# Patient Record
Sex: FEMALE | Race: BLACK OR AFRICAN AMERICAN | Employment: FULL TIME | URBAN - METROPOLITAN AREA
[De-identification: names, ages, dates, MRNs, and addresses within clinical notes are randomized per-mention and may not be internally consistent; named-entity substitution may affect disease eponyms.]

---

## 2017-08-22 ENCOUNTER — HOSPITAL ENCOUNTER (OUTPATIENT)
Dept: PHYSICAL THERAPY | Age: 47
Discharge: HOME | End: 2017-08-22
Payer: COMMERCIAL

## 2017-08-22 PROCEDURE — 97110 THERAPEUTIC EXERCISES: CPT

## 2017-08-22 PROCEDURE — 97162 PT EVAL MOD COMPLEX 30 MIN: CPT

## 2017-08-22 NOTE — PROGRESS NOTES
PHYSICAL THERAPY - DAILY TREATMENT NOTE    Patient Name: Simone Lainez        Date: 2017  : 1970   YES Patient  Verified  Visit #:     Insurance: Payor: BLUE CROSS / Plan: [address] [place] [address] / Product Type: PPO /      In time: 330 Out time: 400   Total Treatment Time: 30     TREATMENT AREA = Lx spine    SUBJECTIVE  Pain Level (on 0 to 10 scale):  ie  / 10   Medication Changes/New allergies or changes in medical history, any new surgeries or procedures?     NO    If yes, update Summary List   Subjective Functional Status/Changes:  []  No changes reported     See POC          OBJECTIVE  Modality rationale:      min [] Estim, type:                                          []  att     []  unatt     []  w/US     []  w/ice    []  w/heat    min []  Mechanical Traction: type/lbs                                               []  pro   []  sup   []  int   []  cont    min []  Ultrasound, settings/location:      min []  Iontophoresis:  []  take home patch w/ dexamethazone    min                                []  in clinic w/ dexamethazone    min []  Ice     []  Heat     position:     min []  Other:       min Manual Therapy:       min Therapeutic Exercise:  [x]  See flow sheet   []  Other:      []  Added:     to improve (function):    []  Changed:     to improve (function):      8 min Patient Education:  YES  Reviewed HEP   []  Progressed/Changed HEP based on:   Reviewed therx per flow sheet, use of lumbar roll during sitting and sleeping postures      Other Objective/Functional Measures:    See POC     Post Treatment Pain Level (on 0 to 10) scale:   ie  / 10     ASSESSMENT  []  See Progress Note/Recertification   Patient will continue to benefit from skilled therapy to address remaining functional deficits: Decreased positional tolerance, ADL tolerance, leisure activities    Progress toward goals / Updated goals:    See POC     PLAN  []  Upgrade activities as tolerated YES Continue plan of care   []  Discharge due to :    []  Other:      Therapist: Miguelito Palmer DPT, CIMT    Date: 8/22/2017 Time: 4:12 PM

## 2017-08-22 NOTE — PROGRESS NOTES
[facility] PHYSICAL THERAPY  [address] [place] [address] [address],[place], [address] [facility] - Phone: [phone]  Fax: [phone] / [address] [facility]  Patient Name: Erin Green : 1970   Treatment   Diagnosis: Low back pain Medical   Diagnosis: Low back pain [M54.5]   Onset Date: Chronic      Referral Source: Evens Morrison MD Start of Care [facility]): 2017   Prior Hospitalization: See medical history Provider #: [id number]   Prior Level of Function: Limited with ADLs and positional tolerance   Comorbidities: Asthma, Hx of C section 20 yrs ago, Partial Hysterectomy 10 yrs ago   Medications: Verified on Patient Summary List   The Plan of Care and following information is based on the information from the initial evaluation.   ==================================================================================  Assessment / key information:  Pt is a 56 yo female s/p increase in central low back pain of insidious onset 2 years ago . Previous rx has included the following: meds, prednisone dose pack. She presents with pain ranging from 4-10/10, located lumbar spine. Pain is made worse with activity, prolonged positions, transitional activities, better with rest, meds. Pt has no c/o radicular sxs in the (B) LE.  L/S AROM: flexion 50 deg, extension 0 deg, LSB 10 deg, RSB 10 deg,all motions guarded and present with pain. Palpation reveals TTP increased hypertonicity to (R) QL, (L) glute med, spinous process of L5/s1. Posture Decrease in lumbar lordosis. Sensation is intact to light touch. MMT LEs: hip flexion 4/5, extension 3/5, abduction 3/5, adduction 4/5, knee flexion 4/5, knee extension 4/5, ankle 4/5. Repeated movement testing reveals no directional preference secondary pain, sxs consistent with Lumbagp.  (-) Special tests include: Slump.  (+) Special tests include: (R) SLR, Seated lumbar extension test, Sacral Flexion Test, PA provocation test of L5/S1  Pt will benefit from PT interventions to address the aforementioned deficits and allow pt to return to PLOF. Eval Complexity: History HIGH Complexity :3+ comorbidities / personal factors will impact the outcome/ POC ;  Examination  MEDIUM Complexity : 3 Standardized tests and measures addressing body structure, function, activity limitation and / or participation in recreation ; Presentation MEDIUM Complexity : Evolving with changing characteristics ; Decision Making MEDIUM Complexity : FOTO score of 26-74; Overall Complexity MEDIUM  ==================================================================================  Problem List: pain affecting function, decrease ROM, decrease strength, decrease ADL/ functional abilitiies, decrease activity tolerance, decrease flexibility/ joint mobility and decrease transfer abilities   Treatment Plan may include any combination of the following: Therapeutic exercise, Therapeutic activities, Neuromuscular re-education, Physical agent/modality, Manual therapy and Patient education  Patient / Family readiness to learn indicated by: asking questions, trying to perform skills and interest  Persons(s) to be included in education: patient (P)  Barriers to Learning/Limitations: no  Measures taken:    Patient Goal (s): Decrease pain and increase positional tolerance    Patient self reported health status: good  Rehabilitation Potential: good   Short Term Goals: To be accomplished in  2  weeks:  1. Pt will be independent and compliant with HEP to decrease pain, increase ROM and return pt to PLOF. 2. Pt will note pain less than or equal to 5/10 at worst to allow increase in functional abilities. 3. Pt will demonstrates increase in Lx AROM extension to 10 deg to aid in positional tolerance    Long Term Goals: To be accomplished in  4  weeks:  1. Increase score on FOTO by > or = 17 to demo an increase in functional activity tolerance.    2. Pt will note < or = 2/10 pain with all mobility to improve comfort with ADLs. 3. Pt will demonstrate GROC >/= 4+ to allow increase in functional activity tolerance   Frequency / Duration:   Patient to be seen  2-3  times per week for 4  weeks:  Patient / Caregiver education and instruction: self care, activity modification and exercises  G-Codes (GP): preet  Therapist Signature: Paco KRUEGERT, CIMT Date: 2/08/8608   Certification Period: na Time: 4:04 PM   ===========================================================================================  I certify that the above Physical Therapy Services are being furnished while the patient is under my care. I agree with the treatment plan and certify that this therapy is necessary. Physician Signature:        Date:       Time:     Please sign and return to [facility] or you may fax the signed copy to [phone]. Thank you.

## 2017-08-24 ENCOUNTER — HOSPITAL ENCOUNTER (OUTPATIENT)
Dept: PHYSICAL THERAPY | Age: 47
Discharge: HOME | End: 2017-08-24
Payer: COMMERCIAL

## 2017-08-24 PROCEDURE — 97140 MANUAL THERAPY 1/> REGIONS: CPT

## 2017-08-24 PROCEDURE — 97110 THERAPEUTIC EXERCISES: CPT

## 2017-08-24 PROCEDURE — 97014 ELECTRIC STIMULATION THERAPY: CPT

## 2017-08-24 NOTE — PROGRESS NOTES
PHYSICAL THERAPY - DAILY TREATMENT NOTE    Patient Name: Milton Navarro        Date: 2017  : 1970   YES Patient  Verified  Visit #:   2   of   12  Insurance: Payor: BLUE CROSS / Plan: [address] [place] [address] / Product Type: PPO /      In time: 230 Out time: 335   Total Treatment Time: 65     Medicare Time Tracking (below)   Total Timed Codes (min):  50 1:1 Treatment Time:       TREATMENT AREA =  Low back pain [M54.5]    SUBJECTIVE  Pain Level (on 0 to 10 scale):  5  / 10   Medication Changes/New allergies or changes in medical history, any new surgeries or procedures? NO    If yes, update Summary List   Subjective Functional Status/Changes:  []  No changes reported     Pain reported in the low back. Occasionally down the (R) hip. States this hasn't happened for about a week. OBJECTIVE  Modalities Rationale:     decrease inflammation, decrease pain and increase tissue extensibility to improve patient's ability to perform pain free ADLs. 10 min [x] Estim, type/location: IFC, (B) lumbar paraspinals. (R) side lying. []  att     [x]  unatt     []  w/US     [x]  w/ice    []  w/heat    min []  Mechanical Traction: type/lbs                   []  pro   []  sup   []  int   []  cont    []  before manual    []  after manual    min []  Ultrasound, settings/location:      min []  Iontophoresis w/ dexamethasone, location:                                               []  take home patch       []  in clinic   10 min []  Ice     [x]  Heat    location/position: Semi-reclined, lumbar.      min []  Vasopneumatic Device, press/temp:     min []  Other:    [x] Skin assessment post-treatment (if applicable):    [x]  intact    []  redness- no adverse reaction     []redness  adverse reaction:        35 min Therapeutic Exercise:  [x]  See flow sheet   Rationale:      increase ROM, increase strength and improve coordination to improve the patients ability to perform pain free ADLs. 15 Min Manual Therapy: TPR (R) lumbar paraspinals, QL, glute/piriformis. Rationale:      decrease pain, increase ROM, increase tissue extensibility and decrease trigger points to improve patient's ability to perform pain free ADLs. min Patient Education:  YES  Reviewed HEP   []  Progressed/Changed HEP based on: Other Objective/Functional Measures: Added multiple exercises per flow sheet. Post Treatment Pain Level (on 0 to 10) scale:   3  / 10     ASSESSMENT  Assessment/Changes in Function:     Difficulty with prone lying - could not tolerate w/ less than 2 pillows under hips. Unable to attempt full prone lying or prone prop. Very TTP along (R) QL and lumbar paraspinals. []  See Progress Note/Recertification   Patient will continue to benefit from skilled PT services to modify and progress therapeutic interventions, address functional mobility deficits, address ROM deficits, address strength deficits, analyze and address soft tissue restrictions, analyze and cue movement patterns, analyze and modify body mechanics/ergonomics and assess and modify postural abnormalities to attain remaining goals. Progress toward goals / Updated goals:    Initiated therex.       PLAN  [x]  Upgrade activities as tolerated YES Continue plan of care   []  Discharge due to :    []  Other:      Therapist: Asad Cates PTA    Date: 8/24/2017 Time: 3:46 PM     Future Appointments  Date Time Provider Fabio Kumar   8/28/2017 4:00 PM Malissa Plant [facility]   8/31/2017 12:00 PM Asad Cates PTA [facility]   9/5/2017 4:00  [address] [facility]   9/7/2017 2:00 PM Gris Delatorre PT [facility]   9/12/2017 2:00 PM Gris Delatorre PT [facility]   9/14/2017 2:00 PM Gris Delatorre PT [facility]   9/19/2017 4:00 PM [facility] PT [facility] 3 [facility]   9/21/2017 2:00 PM Gris Delatorre PT [facility]

## 2017-08-28 ENCOUNTER — HOSPITAL ENCOUNTER (OUTPATIENT)
Dept: PHYSICAL THERAPY | Age: 47
Discharge: HOME | End: 2017-08-28
Payer: COMMERCIAL

## 2017-08-28 PROCEDURE — 97110 THERAPEUTIC EXERCISES: CPT

## 2017-08-28 PROCEDURE — 97140 MANUAL THERAPY 1/> REGIONS: CPT

## 2017-08-28 NOTE — PROGRESS NOTES
PHYSICAL THERAPY - DAILY TREATMENT NOTE    Patient Name: Sulma Saldana        Date: 2017  : 1970   YES Patient  Verified  Visit #:   3   of   12  Insurance: Payor: BLUE CROSS / Plan: [address] [place] [address] / Product Type: PPO /      In time: 4 Out time: 455   Total Treatment Time: 55     Medicare Time Tracking (below)   Total Timed Codes (min):  45 1:1 Treatment Time:       TREATMENT AREA =  Low back pain [M54.5]    SUBJECTIVE  Pain Level (on 0 to 10 scale):  6  / 10   Medication Changes/New allergies or changes in medical history, any new surgeries or procedures? NO    If yes, update Summary List   Subjective Functional Status/Changes:  []  No changes reported     \"I've been laying on my stomach a little at home. It's gotten a lot easier. \"        OBJECTIVE  Modalities Rationale:     decrease pain and increase tissue extensibility to improve patient's ability to perform pain free ADLs. min [] Estim, type/location:                                      []  att     []  unatt     []  w/US     []  w/ice    []  w/heat    min []  Mechanical Traction: type/lbs                   []  pro   []  sup   []  int   []  cont    []  before manual    []  after manual    min []  Ultrasound, settings/location:      min []  Iontophoresis w/ dexamethasone, location:                                               []  take home patch       []  in clinic   10 min []  Ice     [x]  Heat    location/position: Prone, lumbar. min []  Vasopneumatic Device, press/temp:     min []  Other:    [x] Skin assessment post-treatment (if applicable):    [x]  intact    []  redness- no adverse reaction     []redness  adverse reaction:        35 min Therapeutic Exercise:  [x]  See flow sheet   Rationale:      increase ROM, increase strength, improve coordination and improve balance to improve the patients ability to perform pain free ADLs. 10 Min Manual Therapy: TPR (R) lumbar paraspinals, QL, glute med, piriformis. Rationale:      decrease pain, increase ROM, increase tissue extensibility and decrease trigger points to improve patient's ability to perform pain free ADLs. min Patient Education:  YES  Reviewed HEP   []  Progressed/Changed HEP based on: Other Objective/Functional Measures: Therex per flow sheet. Initiated prone lying w/ 3 pillows under hips. 2 pillows @ 1', 1 pillow @ 1', prone no pillow @ 1', prone prop @ 1'. Progressed pt to PPU x10. Attempted lock and sag, painful @ end range. Post Treatment Pain Level (on 0 to 10) scale:   4  / 10     ASSESSMENT  Assessment/Changes in Function:     Pt demonstrating significant improvement in tolerance to lumbar extension. []  See Progress Note/Recertification   Patient will continue to benefit from skilled PT services to modify and progress therapeutic interventions, address functional mobility deficits, address ROM deficits, address strength deficits, analyze and address soft tissue restrictions, analyze and cue movement patterns, analyze and modify body mechanics/ergonomics and assess and modify postural abnormalities to attain remaining goals. Progress toward goals / Updated goals:    Progressing toward STG #3.       PLAN  [x]  Upgrade activities as tolerated YES Continue plan of care   []  Discharge due to :    []  Other:      Therapist: Sidra Paul PTA    Date: 8/28/2017 Time: 4:45 PM     Future Appointments  Date Time Provider Fabio Kumar   8/31/2017 12:00 PM Fatimah Maytrena [facility]   9/5/2017 4:00  [address] 3 [facility]   9/7/2017 2:00 PM Lindy Im, PT [facility]   9/12/2017 2:00 PM Lindy Im, PT [facility]   9/14/2017 2:00 PM Lindy Im, PT [facility]   9/19/2017 4:00 PM [facility] PT [facility] 3 [facility]   9/21/2017 2:00 PM Lindy Im, PT [facility]

## 2017-08-31 ENCOUNTER — HOSPITAL ENCOUNTER (OUTPATIENT)
Dept: PHYSICAL THERAPY | Age: 47
Discharge: HOME | End: 2017-08-31
Payer: COMMERCIAL

## 2017-08-31 PROCEDURE — 97014 ELECTRIC STIMULATION THERAPY: CPT

## 2017-08-31 PROCEDURE — 97110 THERAPEUTIC EXERCISES: CPT

## 2017-08-31 PROCEDURE — 97140 MANUAL THERAPY 1/> REGIONS: CPT

## 2017-08-31 NOTE — PROGRESS NOTES
PHYSICAL THERAPY - DAILY TREATMENT NOTE    Patient Name: Sulma Saldana        Date: 2017  : 1970   YES Patient  Verified  Visit #:   4   of   12  Insurance: Payor: BLUE CROSS / Plan: [address] [place] [address] / Product Type: PPO /      In time: 12 Out time: 1255   Total Treatment Time: 55     Medicare Time Tracking (below)   Total Timed Codes (min):  45 1:1 Treatment Time:       TREATMENT AREA =  Low back pain [M54.5]    SUBJECTIVE  Pain Level (on 0 to 10 scale):  4  / 10   Medication Changes/New allergies or changes in medical history, any new surgeries or procedures? NO    If yes, update Summary List   Subjective Functional Status/Changes:  []  No changes reported     Pain reported across low back and into the (R) glute. OBJECTIVE  Modalities Rationale:     decrease inflammation and decrease pain  to improve patient's ability to perform pain free ADLs. 10 min [x] Estim, type/location: Lumbar IFC in prone, w/ ice                                     []  att     [x]  unatt     []  w/US     [x]  w/ice    []  w/heat    min []  Mechanical Traction: type/lbs                   []  pro   []  sup   []  int   []  cont    []  before manual    []  after manual    min []  Ultrasound, settings/location:      min []  Iontophoresis w/ dexamethasone, location:                                               []  take home patch       []  in clinic   10 min []  Ice     [x]  Heat    location/position: Prone, lumbar. min []  Vasopneumatic Device, press/temp:     min []  Other:    [x] Skin assessment post-treatment (if applicable):    [x]  intact    []  redness- no adverse reaction     []redness  adverse reaction:        25 min Therapeutic Exercise:  [x]  See flow sheet   Rationale:      increase ROM, increase strength and improve coordination to improve the patients ability to perform pain free ADLs. 10 Min Manual Therapy: STM/DTM (B) lumbar paraspinals, (R) glute/piriformis.     Rationale: decrease pain, increase ROM, increase tissue extensibility and decrease trigger points to improve patient's ability to perform pain free ADLs. min Patient Education:  YES  Reviewed HEP   []  Progressed/Changed HEP based on: Other Objective/Functional Measures:    Bassem progression:   Prone lying  Prone prop  PPU    Slow progression to PPU due to pt c/o pain. Gradually relieved with extension activities. Once on PPU, pt reporting relief from symptoms when rising, increase in pain as she returns to lying. Post Treatment Pain Level (on 0 to 10) scale:   3  / 10     ASSESSMENT  Assessment/Changes in Function:     Treatment concluded with pt reporting pain across low back and into posterior (R) upper hip. []  See Progress Note/Recertification   Patient will continue to benefit from skilled PT services to modify and progress therapeutic interventions, address functional mobility deficits, address ROM deficits, address strength deficits, analyze and address soft tissue restrictions, analyze and cue movement patterns, analyze and modify body mechanics/ergonomics and assess and modify postural abnormalities to attain remaining goals. Progress toward goals / Updated goals:    No change in progress toward LTG's with today's session.       PLAN  [x]  Upgrade activities as tolerated YES Continue plan of care   []  Discharge due to :    []  Other:      Therapist: Ansley Sánchez PTA    Date: 8/31/2017 Time: 12:13 PM     Future Appointments  Date Time Provider Fabio Kumar   9/5/2017 4:00 PM [address] [place] [address] 3 [facility]   9/7/2017 2:00 PM Citlaly Medina PT [facility]   9/12/2017 2:00 PM Citlaly Medina PT [facility]   9/14/2017 2:00 PM Citlaly Medina PT [facility]   9/19/2017 4:00 PM [facility] PT [facility] 3 [facility]   9/21/2017 2:00 PM Citlaly Medina PT [facility]

## 2017-09-05 ENCOUNTER — HOSPITAL ENCOUNTER (OUTPATIENT)
Dept: PHYSICAL THERAPY | Age: 47
Discharge: HOME | End: 2017-09-05
Payer: COMMERCIAL

## 2017-09-05 PROCEDURE — 97014 ELECTRIC STIMULATION THERAPY: CPT

## 2017-09-05 PROCEDURE — 97110 THERAPEUTIC EXERCISES: CPT

## 2017-09-05 PROCEDURE — 97140 MANUAL THERAPY 1/> REGIONS: CPT

## 2017-09-05 NOTE — PROGRESS NOTES
PHYSICAL THERAPY - DAILY TREATMENT NOTE    Patient Name: Simone Lainez        Date: 2017  : 1970   YES Patient  Verified  Visit #:     of   12  Insurance: Payor: Hadley Epley / Plan: [address] [place] [address] / Product Type: PPO /      In time: 4:05 Out time: 5:00   Total Treatment Time: 55     Medicare Time Tracking (below)   Total Timed Codes (min):   1:1 Treatment Time:       TREATMENT AREA =  Low back pain [M54.5]    SUBJECTIVE  Pain Level (on 0 to 10 scale):  2  / 10   Medication Changes/New allergies or changes in medical history, any new surgeries or procedures? NO    If yes, update Summary List   Subjective Functional Status/Changes:  []  No changes reported   \"Today is a good today. Being in any one position too long makes my back hurt. I feel like I can kneel longer since starting PT. \"       OBJECTIVE  Modalities Rationale:     decrease inflammation and decrease pain  to improve patient's ability to perform pain free ADLs. 10 min [x] Estim, type/location: Lumbar IFC in prone, w/ ice Prior to TE                                    []  att     [x]  unatt     []  w/US     [x]  w/ice    []  w/heat    min []  Mechanical Traction: type/lbs                   []  pro   []  sup   []  int   []  cont    []  before manual    []  after manual    min []  Ultrasound, settings/location:      min []  Iontophoresis w/ dexamethasone, location:                                               []  take home patch       []  in clinic   10 min []  Ice     [x]  Heat    location/position: Prone, lumbar. min []  Vasopneumatic Device, press/temp:     min []  Other:    [x] Skin assessment post-treatment (if applicable):    [x]  intact    []  redness- no adverse reaction     []redness  adverse reaction:        25 min Therapeutic Exercise:  [x]  See flow sheet   Rationale:      increase ROM, increase strength and improve coordination to improve the patients ability to perform pain free ADLs.       [address] Therapy: STM (B) lumbar paraspinals, (R) glute/piriformis. Rationale:      decrease pain, increase ROM, increase tissue extensibility and decrease trigger points to improve patient's ability to perform pain free ADLs. X min Patient Education:  YES  Reviewed HEP   []  Progressed/Changed HEP based on: Other Objective/Functional Measures:    Bassem progression:   Prone lying x1'  Prone prop x1'  PPU x 10     Post Treatment Pain Level (on 0 to 10) scale:   2 / 10     ASSESSMENT  Assessment/Changes in Function:     Due to sedentary job at [employer]; advised pt to change position every 45' and instructed pt in ergonomics for computer workstation to optimize upright posture and use of lumbar roll due to pt sitting with decreased lordosis and FH/RS. Poor tolerance to STM with hypersensitivity @ L5/S1 junction and R prox glut. []  See Progress Note/Recertification   Patient will continue to benefit from skilled PT services to modify and progress therapeutic interventions, address functional mobility deficits, address ROM deficits, address strength deficits, analyze and address soft tissue restrictions, analyze and cue movement patterns, analyze and modify body mechanics/ergonomics and assess and modify postural abnormalities to attain remaining goals. Progress toward goals / Updated goals:    Slow progress towards STG#3.       PLAN  [x]  Upgrade activities as tolerated YES Continue plan of care   []  Discharge due to :    []  Other:      Therapist: Faith Morton PTA    Date: 9/5/2017 Time: 12:13 PM     Future Appointments  Date Time Provider Fabio Kumar   9/5/2017 4:00  [address] 3 [facility]   9/7/2017 2:00 PM Kash Poplin, PT [facility]   9/12/2017 2:00 PM [place] Poplin, PT [facility]   9/14/2017 2:00 PM [place] Pop[place], PT [facility]   9/19/2017 4:00 PM [facility] PT [facility] 3 [facility]   9/21/2017 2:00 PM [place], PT [facility]

## 2017-09-07 ENCOUNTER — HOSPITAL ENCOUNTER (OUTPATIENT)
Dept: PHYSICAL THERAPY | Age: 47
Discharge: HOME | End: 2017-09-07
Payer: COMMERCIAL

## 2017-09-07 PROCEDURE — 97110 THERAPEUTIC EXERCISES: CPT

## 2017-09-07 PROCEDURE — 97014 ELECTRIC STIMULATION THERAPY: CPT

## 2017-09-07 PROCEDURE — 97140 MANUAL THERAPY 1/> REGIONS: CPT

## 2017-09-07 NOTE — PROGRESS NOTES
PHYSICAL THERAPY - DAILY TREATMENT NOTE    Patient Name: Devonte Starks        Date: 2017  : 1970   YES Patient  Verified  Visit #:   6   of   12  Insurance: Payor: BLUE CROSS / Plan: [address] [place] [address] / Product Type: PPO /      In time: 200 Out time: 300   Total Treatment Time: 60     Medicare Time Tracking (below)   Total Timed Codes (min):   1:1 Treatment Time:       TREATMENT AREA =  Low back pain [M54.5]    SUBJECTIVE  Pain Level (on 0 to 10 scale):  3  / 10   Medication Changes/New allergies or changes in medical history, any new surgeries or procedures? NO    If yes, update Summary List   Subjective Functional Status/Changes:  []  No changes reported     Those exercise do help with some of the pain. And i've been using support in my back when i'm sitting. OBJECTIVE  Modalities Rationale:     decrease inflammation and decrease pain to improve patient's ability to perform pain free ADLs. 10 min [x] Estim, type/location: Lumbar IFC in prone, w/ ice                                 Prior to TE                                     []  att     [x]  unatt     []  w/US     [x]  w/ice    []  w/heat    min []  Mechanical Traction: type/lbs                   []  pro   []  sup   []  int   []  cont    []  before manual    []  after manual    min []  Ultrasound, settings/location:      min []  Iontophoresis w/ dexamethasone, location:                                               []  take home patch       []  in clinic   10 min []  Ice     [x]  Heat    location/position: Post L/s    min []  Vasopneumatic Device, press/temp:     min []  Other:    [x] Skin assessment post-treatment (if applicable):    [x]  intact    [x]  redness- no adverse reaction     []redness  adverse reaction:        30 min Therapeutic Exercise:  [x]  See flow sheet   Rationale:      increase ROM and increase strength to improve the patients ability to  perform pain free ADLs.        10 min Manual Therapy: STM (B) lumbar paraspinals, (R) glute/piriformis. Rationale:      decrease pain, increase ROM and increase tissue extensibility to improve patient's ability to  perform pain free ADLs.       min Patient Education:  YES  Reviewed HEP   []  Progressed/Changed HEP based on: Other Objective/Functional Measures:    GROC: +2 a little better  FOTO: 59 vs 46 on IE. Performed hip extension with 1 pillow to decrease low back pain with exercise. Add YTB to clams and bridges. Post Treatment Pain Level (on 0 to 10) scale:   5  / 10     ASSESSMENT  Assessment/Changes in Function:     Reports HEP becoming slightly easier to perform and slightly increased back motion. Reports using a roll at work for back support which helps with her sitting posture. []  See Progress Note/Recertification   Patient will continue to benefit from skilled PT services to modify and progress therapeutic interventions, address functional mobility deficits, address ROM deficits, address strength deficits, analyze and address soft tissue restrictions and analyze and cue movement patterns to attain remaining goals. Progress toward goals / Updated goals:    · Long Term Goals: To be accomplished in  4  weeks:  1. Increase score on FOTO by > or = 17 to demo an increase in functional activity tolerance: progressing well: 13 point improvement    2. Pt will note < or = 2/10 pain with all mobility to improve comfort with ADLs: slowly progressing    3.  Pt will demonstrate GROC >/= 4+ to allow increase in functional activity tolerance: progressing well     PLAN  []  Upgrade activities as tolerated YES Continue plan of care   []  Discharge due to :    []  Other:      Therapist: MARCY Brennan    Date: 9/7/2017 Time: 2:05 PM       Future Appointments  Date Time Provider Fabio Kumar   9/12/2017 2:00 PM Katherine Lagunas, PT [facility]   9/14/2017 2:00 PM Katherine Lagunas PT [facility]   9/19/2017 4:00 PM [facility]  [address] [facility] 9/21/2017 2:00 PM VALENTINA Peña [facility]

## 2017-09-12 ENCOUNTER — HOSPITAL ENCOUNTER (OUTPATIENT)
Dept: PHYSICAL THERAPY | Age: 47
Discharge: HOME | End: 2017-09-12
Payer: COMMERCIAL

## 2017-09-12 PROCEDURE — 97110 THERAPEUTIC EXERCISES: CPT

## 2017-09-12 PROCEDURE — 97140 MANUAL THERAPY 1/> REGIONS: CPT

## 2017-09-12 PROCEDURE — 97014 ELECTRIC STIMULATION THERAPY: CPT

## 2017-09-12 NOTE — PROGRESS NOTES
PHYSICAL THERAPY - DAILY TREATMENT NOTE    Patient Name: Cristal Multani        Date: 2017  : 1970   YES Patient  Verified  Visit #:     Insurance: Payor: BLUE CROSS / Plan: [address] [place] [address] / Product Type: PPO /      In time: 200 Out time: 250   Total Treatment Time: 50       TREATMENT AREA = Low back pain [M54.5]    SUBJECTIVE  Pain Level (on 0 to 10 scale):  3  / 10   Medication Changes/New allergies or changes in medical history, any new surgeries or procedures?     NO    If yes, update Summary List   Subjective Functional Status/Changes:  []  No changes reported     Reports decreasing pain levels and decrease in overall lumbar s/s since last visit    \"Its getting better, I have less pain\"          OBJECTIVE  Modalities Rationale:     decrease pain and increase tissue extensibility to improve patient's ability to perform ADLS  10 min [x] Estim, type/location:  IFC to lumbar paraspinals                                     []  att     [x]  unatt     []  w/US     []  w/ice    [x]  w/heat    min []  Mechanical Traction: type/lbs                   []  pro   []  sup   []  int   []  cont    []  before manual    []  after manual    min []  Ultrasound, settings/location:      min []  Iontophoresis w/ dexamethasone, location:                                               []  take home patch       []  in clinic   10 min []  Ice     [x]  Heat    location/position: Lx spine    min []  Vasopneumatic Device, press/temp:     min []  Other:    [x] Skin assessment post-treatment (if applicable):    [x]  intact    [x]  Redness    []redness  adverse reaction:        20 min Therapeutic Exercise:  [x]  See flow sheet   Rationale:      increase ROM and increase strength to improve the patients ability to perform ADLs      10 Min Manual Therapy: DTM to (B) lumbar paraspinals, QL, glute med, PA mobs to Lumbar L4-L5   Rationale:      decrease pain, increase ROM and increase tissue extensibility to improve patient's ability to perform ADLs     min Patient Education:  YES  Reviewed HEP   []  Progressed/Changed HEP based on: Other Objective/Functional Measures:    Con't with significant TTP to (R) glute med, (R) piriformis, mild TTP to (B) lumbar paraspinals,s      Post Treatment Pain Level (on 0 to 10) scale:   0  / 10     ASSESSMENT  Assessment/Changes in Function:     Demonstrates decrease in pain levels after treatment session, progressing with overall function and positional tolerance      []  See Progress Note/Recertification   Patient will continue to benefit from skilled PT services to modify and progress therapeutic interventions, address functional mobility deficits, address ROM deficits, address strength deficits and analyze and address soft tissue restrictions to attain remaining goals.    Progress toward goals / Updated goals:    Progressing with STGs 2-3     PLAN  []  Upgrade activities as tolerated YES Continue plan of care   []  Discharge due to :    []  Other:      Therapist: Vicenta KRUEGERT, CIMT    Date: 9/12/2017 Time: 2:05 PM       Future Appointments  Date Time Provider Fabio Kumar   [facility]2017 [facility]   9/19/2017 4:00 PM [address] [place] [address]   9/21/2017 2:00 PM Aleksey Marques PT [facility]

## 2017-09-14 ENCOUNTER — HOSPITAL ENCOUNTER (OUTPATIENT)
Dept: PHYSICAL THERAPY | Age: 47
Discharge: HOME | End: 2017-09-14
Payer: COMMERCIAL

## 2017-09-14 PROCEDURE — 97140 MANUAL THERAPY 1/> REGIONS: CPT

## 2017-09-14 PROCEDURE — 97014 ELECTRIC STIMULATION THERAPY: CPT

## 2017-09-14 PROCEDURE — 97110 THERAPEUTIC EXERCISES: CPT

## 2017-09-14 NOTE — PROGRESS NOTES
PHYSICAL THERAPY - DAILY TREATMENT NOTE    Patient Name: [facility]        Date: 2017  : 1970   YES Patient  Verified  Visit #:   8     Insurance: Payor: BLUE CROSS / Plan: [address] [place] [address] / Product Type: PPO /      In time: 200 Out time: 250   Total Treatment Time: 50       TREATMENT AREA = Low back pain [M54.5]    SUBJECTIVE  Pain Level (on 0 to 10 scale):  2  / 10   Medication Changes/New allergies or changes in medical history, any new surgeries or procedures?     NO    If yes, update Summary List   Subjective Functional Status/Changes:  []  No changes reported     Reports decrease in pain levels with use of PT services           OBJECTIVE  Modalities Rationale:     decrease inflammation and decrease pain to improve patient's ability to perform ADLs, increase positional tolerance   10 min [x] Estim, type/location:  IFC to lumbar spine                                    []  att     [x]  unatt     []  w/US     []  w/ice    []  w/heat    min []  Mechanical Traction: type/lbs                   []  pro   []  sup   []  int   []  cont    []  before manual    []  after manual    min []  Ultrasound, settings/location:      min []  Iontophoresis w/ dexamethasone, location:                                               []  take home patch       []  in clinic   10 min []  Ice     [x]  Heat    location/position: Prone to lumbar spine    min []  Vasopneumatic Device, press/temp:     min []  Other:    [x] Skin assessment post-treatment (if applicable):    [x]  intact    []  redness- no adverse reaction     []redness  adverse reaction:        20 min Therapeutic Exercise:  [x]  See flow sheet   Rationale:      increase ROM and increase strength to improve the patients ability to perform ADLs increase positional tolerance      10 Min Manual Therapy: DTM to (B) lumbar paraspinals, QL, PA mobs to lumbar spine L2-L5   Rationale:      decrease pain, increase ROM and increase tissue extensibility to improve patient's ability to perform ADLs     min Patient Education:  YES  Reviewed HEP   []  Progressed/Changed HEP based on: Other Objective/Functional Measures:    Demonstrates mild hypertonicity to (B) lx paraspinals, PA mobs to L2-L5 due to limited PA mobility with extension based activities      Post Treatment Pain Level (on 0 to 10) scale:   1  / 10     ASSESSMENT  Assessment/Changes in Function:     Progressing with overall pain levels, demonstrates increased ease with perform lumbar extension - press up activities while maintaining hips on table     []  See Progress Note/Recertification   Patient will continue to benefit from skilled PT services to modify and progress therapeutic interventions, address functional mobility deficits, address ROM deficits, address strength deficits, analyze and address soft tissue restrictions and analyze and cue movement patterns to attain remaining goals. Progress toward goals / Updated goals:     Will monitor and progress as able      PLAN  []  Upgrade activities as tolerated YES Continue plan of care   []  Discharge due to :    []  Other:      Therapist: Hyacinth Monk DPT, CIMT    Date: 9/14/2017 Time: 2:36 PM       Future Appointments  Date Time Provider Fabio Kumar   9/19/2017 4:00  [facility] [address] [facility]   9/21/2017 2:00 PM Neo Garza, PT [facility]

## 2017-09-19 ENCOUNTER — HOSPITAL ENCOUNTER (OUTPATIENT)
Dept: PHYSICAL THERAPY | Age: 47
Discharge: HOME | End: 2017-09-19
Payer: COMMERCIAL

## 2017-09-19 PROCEDURE — 97140 MANUAL THERAPY 1/> REGIONS: CPT

## 2017-09-19 PROCEDURE — 97110 THERAPEUTIC EXERCISES: CPT

## 2017-09-19 NOTE — PROGRESS NOTES
PHYSICAL THERAPY - DAILY TREATMENT NOTE    Patient Name: Power Camacho        Date: 2017  : 1970   YES Patient  Verified  Visit #:     Insurance: Payor: Jj Gilliland / Plan: [address] [place] [address] / Product Type: PPO /      In time: 4:05 Out time: 4:55   Total Treatment Time: 55       TREATMENT AREA = Low back pain [M54.5]    SUBJECTIVE  Pain Level (on 0 to 10 scale):  0  / 10   Medication Changes/New allergies or changes in medical history, any new surgeries or procedures? NO    If yes, update Summary List   Subjective Functional Status/Changes:  []  No changes reported     \"I have no pain today and I only had a little bit yesterday. I still get pain with standing > 1 hour, bending over to clean the tub, and sweeping. \"         OBJECTIVE  Modalities Rationale:     decrease inflammation and decrease pain to improve patient's ability to perform ADLs, increase positional tolerance   PD min [x] Estim, type/location:  IFC to lumbar spine                                    []  att     [x]  unatt     []  w/US     []  w/ice    []  w/heat    min []  Mechanical Traction: type/lbs                   []  pro   []  sup   []  int   []  cont    []  before manual    []  after manual    min []  Ultrasound, settings/location:      min []  Iontophoresis w/ dexamethasone, location:                                               []  take home patch       []  in clinic   10 min []  Ice     [x]  Heat    location/position: Prone to lumbar spine    min []  Vasopneumatic Device, press/temp:     min []  Other:    [x] Skin assessment post-treatment (if applicable):    [x]  intact    []  redness- no adverse reaction     []redness  adverse reaction:        30 min Therapeutic Exercise:  [x]  See flow sheet   Rationale:      increase ROM and increase strength to improve the patients ability to perform ADLs increase positional tolerance      10 Min Manual Therapy: DTM to (B) lumbar paraspinals, QL, PA mobs to lumbar spine L2-L5   Rationale:      decrease pain, increase ROM and increase tissue extensibility to improve patient's ability to perform ADLs    X min Patient Education:  YES  Reviewed HEP   []  Progressed/Changed HEP based on: Other Objective/Functional Measures:    Con't with significant TTP to (R) glute med, (R) piriformis, mild TTP to (B) lumbar paraspinals. Post Treatment Pain Level (on 0 to 10) scale:   0 / 10     ASSESSMENT  Assessment/Changes in Function:     Pt denies radicular sx and no pain today and demonstrates improved tolerance to MT .     []  See Progress Note/Recertification   Patient will continue to benefit from skilled PT services to modify and progress therapeutic interventions, address functional mobility deficits, address ROM deficits, address strength deficits, analyze and address soft tissue restrictions and analyze and cue movement patterns to attain remaining goals. Progress toward goals / Updated goals:    Progressing with STGs 2-3     PLAN  [x]  Upgrade activities as tolerated YES Continue plan of care   []  Discharge due to :    [x]  Other: PN needed NV.       Therapist: Alejandro Gallo DPT, CIMT    Date: 9/19/2017 Time: 2:36 PM       Future Appointments  Date Time Provider Fabio Kumar   9/19/2017 4:00  [address] [facility]   9/21/2017 2:00 PM Fay Ortiz, PT [facility]

## 2017-09-21 ENCOUNTER — HOSPITAL ENCOUNTER (OUTPATIENT)
Dept: PHYSICAL THERAPY | Age: 47
Discharge: HOME | End: 2017-09-21
Payer: COMMERCIAL

## 2017-09-21 PROCEDURE — 97110 THERAPEUTIC EXERCISES: CPT

## 2017-09-21 PROCEDURE — 97140 MANUAL THERAPY 1/> REGIONS: CPT

## 2017-09-21 NOTE — PROGRESS NOTES
PHYSICAL THERAPY - DAILY TREATMENT NOTE    Patient Name: Archana Fernandez        Date: 2017  : 1970   YES Patient  Verified  Visit #:   10   of   12  Insurance: Payor: Mateo Gu / Plan: [address] [place] [address] / Product Type: PPO /      In time: 200 Out time: 245   Total Treatment Time: 45       TREATMENT AREA = Low back pain [M54.5]    SUBJECTIVE  Pain Level (on 0 to 10 scale):  2  / 10   Medication Changes/New allergies or changes in medical history, any new surgeries or procedures?     NO    If yes, update Summary List   Subjective Functional Status/Changes:  []  No changes reported     Reports soreness in lumbar region after sitting majority of day          OBJECTIVE  Modalities Rationale:     decrease inflammation, decrease pain and increase tissue extensibility to improve patient's ability to increase positional tolerance    min [] Estim, type/location:                                      []  att     []  unatt     []  w/US     []  w/ice    []  w/heat    min []  Mechanical Traction: type/lbs                   []  pro   []  sup   []  int   []  cont    []  before manual    []  after manual    min []  Ultrasound, settings/location:      min []  Iontophoresis w/ dexamethasone, location:                                               []  take home patch       []  in clinic   10 Min []  Ice     [x]  Heat    Location/position: Lx spine in prone     min []  Vasopneumatic Device, press/temp:     min []  Other:    [x] Skin assessment post-treatment (if applicable):    []  intact    []  redness- no adverse reaction     []redness  adverse reaction:        25 min Therapeutic Exercise:  [x]  See flow sheet   Rationale:      increase ROM and increase strength to improve the patients ability to perform ADLs     10 Min Manual Therapy: DTM to (B) lx paraspinals, QL and glute med, region, PA mobs to L2-L5 region   Rationale:      decrease pain, increase ROM and increase tissue extensibility to improve patient's ability increase in standing tolerance      min Patient Education:  YES  Reviewed HEP   []  Progressed/Changed HEP based on: Other Objective/Functional Measures:    Reports soreness in lx spine at onset of visit      Post Treatment Pain Level (on 0 to 10) scale:   0  / 10     ASSESSMENT  Assessment/Changes in Function:     Progressing with overall function, pt advised to perform standing lx extension      []  See Progress Note/Recertification   Patient will continue to benefit from skilled PT services to modify and progress therapeutic interventions, address functional mobility deficits, address ROM deficits, address strength deficits and analyze and address soft tissue restrictions to attain remaining goals. Progress toward goals / Updated goals:     Will monitor and progress as able, see PN note      PLAN  []  Upgrade activities as tolerated YES Continue plan of care   []  Discharge due to :    []  Other:      Therapist: Hilario Ndiaye DPT, CIMT    Date: 9/21/2017 Time: 1:42 PM       Future Appointments  Date Time Provider Fabio Kumar   9/21/2017 2:00 PM [facility]

## 2017-09-21 NOTE — PROGRESS NOTES
[address] PHYSICAL THERAPY  [address] [place] [address] 51, [address],[place], 70 [facility] - Phone: [phone]  Fax: [phone]  PROGRESS NOTE  Patient Name: Archana Fernandez : 1970   Treatment/Medical Diagnosis: Low back pain [M54.5]   Referral Source: Layla Pierson MD     Date of Initial Visit: 17 Attended Visits: 10 Missed Visits:      SUMMARY OF TREATMENT  Therapeutic ex including strengthening, ROM, flexibility, stabilization, manual therapy including: Patient education, HEP  CURRENT STATUS  Pt is making good  progress in therapy. Pain is rated as 0-2/10, demonstrates con't limitations with prolonged standing lumbar extension, improved with use of Bassem extension program. Score on the FOTO has improved from 46 at IE to 59, GROC of 2+. Con't with increase in soft tissue restriction in lumbar and gluteal region. Clementeen Yeyo of Progress Goal Met?   1.  1. Increase score on FOTO by > or = 17 to demo an increase in functional activity tolerance. Status at last Eval: 46 Current Status: 59 progressing   2.  2. Pt will note < or = 2/10 pain with all mobility to improve comfort with ADLs. Status at last Eval: 4-10/10 Current Status: 0-2/10 yes   3.  3. Pt will demonstrate GROC >/= 4+ to allow increase in functional activity tolerance    Status at last Eval: NA   2+ progressing     New Goals to be achieved in __3-4__  weeks:  1.  Con't with goals 1-3   2.     3.     RECOMMENDATIONS  Recommend con't PT services 2-3x week for 4 weeks to increase core stability  If you have any questions/comments please contact us directly at [phone]. Thank you for allowing us to assist in the care of your patient.     Therapist Signature: Fatuma Choudhary DPT, CIMT Date: 2017     Time: 2:23 PM   NOTE TO PHYSICIAN:  PLEASE COMPLETE THE ORDERS BELOW AND FAX TO   [facility]: [phone]  If you are unable to process this request in 24 hours please contact our office: [phone]    ___ I have read the above report and request that my patient continue as recommended.   ___ I have read the above report and request that my patient continue therapy with the following changes/special instructions:_________________________________________________________   ___ I have read the above report and request that my patient be discharged from therapy.      Physician Signature:        Date:       Time:

## 2017-12-05 NOTE — PROGRESS NOTES
[address] [place] [address] PHYSICAL THERAPY   Nida Benjamin [facility], [place] [address],[facility], 70 [facility] - Phone: [phone]  Fax: [phone]  DISCHARGE NOTE  Patient Name: Sean Polk : 1970   Treatment/Medical Diagnosis: Low back pain [M54.5]   Referral Source: Louis Miller MD     Date of Initial Visit: 17 Attended Visits: 10 Missed Visits: 0     SUMMARY OF TREATMENT  Physical therapy treatment included: Therapeutic ex including strengthening, ROM, flexibility, stabilization, manual therapy including: Patient education, HEP  CURRENT STATUS  Pt did not return to PT after last session and PN written on 17. Per last PN:    \"Pt is making good  progress in therapy. Pain is rated as 0-2/10, demonstrates con't limitations with prolonged standing lumbar extension, improved with use of Bassem extension program. Score on the FOTO has improved from 46 at IE to 59, GROC of 2+. Con't with increase in soft tissue restriction in lumbar and gluteal region. \"     Goal/Measure of Progress Goal Met?   1.  1. Increase score on FOTO by > or = 17 to demo an increase in functional activity tolerance. Status at last Eval: 46 Current Status: 59 progressing   2.  2. Pt will note < or = 2/10 pain with all mobility to improve comfort with ADLs. Status at last Eval: 4-10/10 Current Status: 0-2/10 yes   3.  3. Pt will demonstrate GROC >/= 4+ to allow increase in functional activity tolerance    Status at last Eval: NA   2+ progressing     RECOMMENDATIONS  Patient discharged from PT due to not returning to PT after visit on 17. Thank you for this referral.    If you have any questions/comments please contact us directly at [phone]. Thank you for allowing us to assist in the care of your patient. Therapist Signature: Alisson Cuevas.  Zackary Snellen, DPT, ATC Date: 2017     Time: 5:18 PM

## 2021-11-17 ENCOUNTER — HOSPITAL ENCOUNTER (OUTPATIENT)
Dept: PHYSICAL THERAPY | Age: 51
Discharge: HOME | End: 2021-11-17
Payer: COMMERCIAL

## 2021-11-17 PROCEDURE — 97110 THERAPEUTIC EXERCISES: CPT

## 2021-11-17 PROCEDURE — 97162 PT EVAL MOD COMPLEX 30 MIN: CPT

## 2021-11-17 NOTE — PROGRESS NOTES
PELVIC FLOOR DAILY TREATMENT NOTE 8-    Patient Name: Cristofer Doran  Date:2021  : 1970  [x]  Patient  Verified  Payor: [employer] / Plan: [employer] / Product Type: HMO /    In time:11:21  Out time:12:27  Total Treatment Time (min): 66  Total Timed Codes (min): na  1:1 Treatment Time (min): na   Visit #: 1 of 8    Treatment Area: Pelvic floor dysfunction [M62.89]    SUBJECTIVE  Pain Level (0-10 scale): 7/10 low back   Any medication changes, allergies to medications, adverse drug reactions, diagnosis change, or new procedure performed?: [x] No    [] Yes (see summary sheet for update)  Subjective functional status/changes:   [] No changes reported  See POC    OBJECTIVE      Billed As:   [x] TE 25 minutes [] TA   [] Neuro   [] Self Care Patient Education: [x] Review HEP/POC/Goals  Educated Pt in pelvic floor anatomy, function/dysfunction, rectocele and correct execution of a pelvic floor contraction. Reviewed biofeedback results. [] Progressed/Changed HEP based on:   [] positioning   [] body mechanics   [] transfers   [] heat/ice application    [] other:        Pain Level (0-10 scale) post treatment: 7/10    ASSESSMENT/Changes in Function:   Justification for Eval Code Complexity:  Patient History : See POC  Examination see exam   Clinical Presentation: evolving  Clinical Decision Making : FOTO : 39 /100    Patient will continue to benefit from skilled PT services to modify and progress therapeutic interventions, address strength deficits, instruct in home and community integration and address UUI, nocturia, increased frequency of urination to attain remaining goals. [x]  See Plan of Care  []  See progress note/recertification  []  See Discharge Summary         Progress towards goals / Updated goals:  Initial evaluation completed with home exercise program and education initiated.    PLAN  [x]  Upgrade activities as tolerated     []  Continue plan of care  []  Update interventions per flow sheet       []  Discharge due to:_  []  Other:_      Heladio Fernandez, PT 11/17/2021  12:27 PM      Future Appointments   Date Time Provider Fabio Kumar   11/24/2021  9:45 AM Yosef Galdamez, PT [facility]   12/1/2021 10:30 AM Yosef Galdamez, PT [facility]   12/10/2021  7:30 AM Yosef Galdamez, PT [facility]   12/15/2021 10:30 AM Yosef Galdamez, PT [facility]   12/21/2021  3:30 PM Yosef Galdamez, PT [facility]   12/29/2021  2:45 PM Yosef Galdamez, PT [facility]

## 2021-11-17 NOTE — PROGRESS NOTES
[facility] PHYSICAL [facility]ERAPY  [x]  At [facility] [address] [address] - Phone: [phone] Fax: [phone] / [phone] [place]  Patient Name: Aimee Daigle : 1970   Medical   Diagnosis: Pelvic floor dysfunction [M62.89] Treatment Diagnosis: Pelvic floor dysfunction [M62.89]   Onset Date: 10/24/2021     Referral Source: Negrita Luna MD [facility]): 2021   Prior Hospitalization: See medical history Provider #: [id number]   Prior Level of Function: Chronic worsening over the past 3-4 years. Comorbidities: , Hysterectomy   Medications: Verified on Patient Summary List   The Plan of Care and following information is based on the information from the initial evaluation.   ==================================================================================  Assessment / key information: Patient is a 46 y.o. yo female  with 2  vaginal deliveries and 1  section who presents to [facility] with diagnosis of Pelvic floor dysfunction [M62.89]. Patient reports urinary incontinence occurring 8-12x daily, urgency with inability to delay >1-2 minutes, perineal pressure all day, increased frequency of daytime urination at once every hour and nocturia 3-4x. Patient wears poise pad #2  for protection. Bowel movement frequency is decreased to 1x weekly unless she takes Kombucha and then has daily movements. She has leaking with hard sneezing and laughing. She has an urge to go most of time even right after she has emptied the bladder. Patient reports carrying 5 poise pads with her wherever she goes. She uses 3-4 pads in a day. She is sexually active but has decreased sensation after initial penetration. Patient presents to PT with severely impaired strength of pelvic floor muscles scoring 1/0/0/5  on PERF.   On biofeedback there was low net rise of fast twitch contraction at 2.4 microvolts and low net rise with poor quality of slow twitch contraction at 1.52 microvolts. There is a grade 2 rectocele present. Patient also presents with impaired strength of the transverse abdominus muscle on biofeedback and bilateral impaired hip abduction strength at 3+/5. Patient scored 39 on FOTO/Urinary problem indicating decreased quality of life. Patient can benefit from PT for retraining of muscle control on biofeedback to increase pelvic floor muscle strength, core/hip strengthening and behavior modification techniques to decrease urinary incontinence, urgency, frequency of urination and nocturia, increase sexual appreciation.    ==================================================================================  Eval Complexity: History: HIGH Complexity :3+ comorbidities / personal factors will impact the outcome/ POC Exam:HIGH Complexity : 4+ Standardized tests and measures addressing body structure, function, activity limitation and / or participation in recreation  Presentation: MEDIUM Complexity : Evolving with changing characteristics  Clinical Decision Making:MEDIUM Complexity : FOTO score of 26-74Overall Complexity:MEDIUM  Problem List: Pelvic pain/dysfunction, Decreased pelvic floor mm awareness, Decreased pelvic floor mm strength, Improper voiding habits, Urinary urgency and Other  Treatment Plan may include any combination of the following: Therapeutic exercise, Urge suppression techniques, Neuromuscular re-education, Manual therapy, Physical agent/modality, Patient education and Other  Patient / Family readiness to learn indicated by: asking questions, trying to perform skills and interest  Persons(s) to be included in education: patient (P)  Barriers to Learning/Limitations: None  Measures taken:    Patient Goal (s): \"To have my muscle tighter to help with the incontinence and learn where the pelvic floor is and how to exercise it.   To have sexual feeling back.\"   Patient self reported health status: good  Rehabilitation Potential: good  Short Term Goals: To be accomplished in 4 weeks:   1. Patient performing pelvic floor exercises 3x day. 2. Patient will report 20% subjective improvement in urinary incontinence with ADLs. 3. Increase net rise of fast twitch contraction to 7 microvolts in supine to increase continence. 4. Patient using urge suppression techniques. Long Term Goals: To be accomplished in 8 weeks:   1. Patient independent in HEP     2. Patient will increase sore on FOTO/Urinary Problem to 51 indicating improved continence and quality of life. 3. Increase net rise of fast twitch contraction to 12 microvolts in stanidn to increase continence. 4. Nocturia decreased to 2x nightly. 5. Patient will report 25% improvement in sexual appreciation. Frequency / Duration:   Patient to be seen  1  times per week for 8  weeks:  Patient / Caregiver education and instruction:Proper Voiding Habits, Exercises and Bladder Retraining      Therapist Signature: Kg Awad PT Date: 04/94/0696   Certification Period: na Time: 11:25 AM   ==================================================================================  I certify that the above Physical Therapy Services are being furnished while the patient is under my care. I agree with the treatment plan and certify that this therapy is necessary. Physician Signature:        Date:       Time:     Please sign and return to [facility]. or you may fax the signed copy to [phone]. Thank you.     Esau Qureshi MD

## 2021-11-24 ENCOUNTER — APPOINTMENT (OUTPATIENT)
Dept: PHYSICAL THERAPY | Age: 51
End: 2021-11-24
Payer: COMMERCIAL

## 2021-12-01 ENCOUNTER — HOSPITAL ENCOUNTER (OUTPATIENT)
Dept: PHYSICAL THERAPY | Age: 51
Discharge: HOME | End: 2021-12-01
Payer: COMMERCIAL

## 2021-12-01 PROCEDURE — 97110 THERAPEUTIC EXERCISES: CPT

## 2021-12-01 PROCEDURE — 97112 NEUROMUSCULAR REEDUCATION: CPT

## 2021-12-01 NOTE — PROGRESS NOTES
PELVIC FLOOR DAILY TREATMENT NOTE  1-    Patient Name: Donna Álvarez  Date:2021  : 1970  [x]  Patient  Verified  Payor: Lisbet Cabrera / Plan: [employer] / Product Type: HMO /    In time:10:34  Out time:11:15  Total Treatment Time (min): 41    (for [employer] and [employer] only)  Total Timed Codes (min): na  1:1 Treatment Time (min): na     Visit #: 2 of 8    Treatment Area: Pelvic floor dysfunction [M62.89]    SUBJECTIVE  Pain Level (0-10 scale): 9.5 LBP  Any medication changes, allergies to medications, adverse drug reactions, diagnosis change, or new procedure performed?: [x] No    [] Yes (see summary sheet for update)  Subjective functional status/changes:   [] No changes reported  Patient reports doing HEP 2x day. OBJECTIVE  Modality rationale: Increase pelvic floor muscle strength and Improve quality of pelvic floor contractions in order to Increase urinary continence, Decrease urinary urgency, Increase ability to delay urination, Decrease frequency of urination, Decrease nocturia, Improve ability to engage in sexual intercourse and Improve ability to perform ADLs.    Min Type Additional Details   25 [x] Biofeedback x 25 minutes    supine surface    [] Estim: []Att   []Unatt        []TENS instruct                  []IFC  []Premod   []NMES                     []Other:  []w/US   []w/ice   []w/heat  Position:  Location:    []  Traction: [] Cervical       []Lumbar                       [] Prone          []Supine                       []Intermittent   []Continuous Lbs:  [] before manual  [] after manual    []  Ultrasound: []Continuous   [] Pulsed                           []1MHz   []3MHz Location:  W/cm2:    []  Iontophoresis with dexamethasone         Location: [] Take home patch   [] In clinic    []  Ice     []  heat  []  Ice massage Position:  Location:    []  Vasopneumatic Device Pressure:       [] lo [] med [] hi   Temperature: [] lo [] med [] hi   [x] Skin assessment post-treatment:  [x]intact []redness- no adverse reaction       []redness - adverse reaction:     na min Therapeutic Exercise:  [x] See flow sheet :  []  Pelvic floor strengthening                []  Pelvic floor downtraining  []  Quality pelvic floor contractions      []  Relaxation techniques  []  Urge suppression exercises  []  Other: Core strengthening   Rationale: na in order to na.     min Therapeutic Activity:  []  See flow sheet :   Rationale: na in order to na.      min Manual Therapy:    Rationale: na in order to na. The manual therapy interventions were performed at a separate and distinct time from the therapeutic activities interventions. Billed As:    [x] TE 16 min   [] TA   [] Neuro   [] Self Care Patient Education: [x] Review HEP  Patient educated in doing bladder diary this week. Discussed the difference between stress and urge incontinence. [x] Progressed/Changed HEP based on: Pelvic floor HEP 2x day in supine knees bent, 1x day legs straight. Add TA 3x week. .[] positioning   [] body mechanics   [] transfers   [] heat/ice application    [] other:        Other Objective/Functional Measures:    [x]baseline resting tone: -   [x]slow twitch mms 19.5(8.76) in supine. [x]fast twitch mms 25.8(10.68). Pain Level (0-10 scale) post treatment: 7.5    ASSESSMENT/Changes in Function: Patient demonstrates improved strength of pelvic floor muscles in supine knees bent. Low endurance for both fast and slow twitch. Patient will continue to benefit from skilled PT services to modify and progress therapeutic interventions, address strength deficits, instruct in home and community integration and address UUI, nocturia, increased frequency of urination to attain remaining goals       []  See Plan of Care  []  See progress note/recertification  []  See Discharge Summary         Progress towards goals / Updated goals:                 1. Patient performing pelvic floor exercises 3x day.   Met                 2. Patient will report 20% subjective improvement in urinary incontinence with ADLs. 3. Increase net rise of fast twitch contraction to 7 microvolts in supine to increase continence. Met                 4. Patient using urge suppression techniques.     PLAN  [x]  Upgrade activities as tolerated     []  Continue plan of care  []  Update interventions per flow sheet       []  Discharge due to:_  []  Other:_      Delroy Sharma, PT 12/1/2021  11:15 AM      Future Appointments   Date Time Provider Fabio Kumar   12/10/2021  7:30 AM Aremalika Gu,  [facility]   12/15/2021 10:30 AM Arelia Riccardo,  [address] [facility]   12/21/2021  3:30 PM Arelia Riccardo,  [address] [facility]   12/29/2021  2:45 PM Arelia Riccardo,  [facility] [address] [facility]

## 2021-12-10 ENCOUNTER — APPOINTMENT (OUTPATIENT)
Dept: PHYSICAL THERAPY | Age: 51
End: 2021-12-10
Payer: COMMERCIAL

## 2021-12-15 ENCOUNTER — APPOINTMENT (OUTPATIENT)
Dept: PHYSICAL THERAPY | Age: 51
End: 2021-12-15
Payer: COMMERCIAL

## 2021-12-21 ENCOUNTER — APPOINTMENT (OUTPATIENT)
Dept: PHYSICAL THERAPY | Age: 51
End: 2021-12-21
Payer: COMMERCIAL

## 2021-12-28 NOTE — PROGRESS NOTES
[facility] PHYSICAL THERAPY  [address] [facility] [address],[place], [facility] - Phone: [phone]  Fax: [phone]    DISCHARGE NOTE  Patient Name: Raul Luevano : 1970   Treatment/Medical Diagnosis: Pelvic floor dysfunction [M62.89]   Referral Source: Damari Allen MD     Date of Initial Visit: 2021 Attended Visits: 2 Missed Visits: 2       SUMMARY OF TREATMENT  Pt attended only initial evaluation and     1     follow-ups and then did not continue PT secondary to insurance not reauthorized. Therefore a formal reassessment of goals was not performed. RECOMMENDATIONS  Discontinue physical therapy due to insurance not reauthorized. If you have any questions/comments please contact us directly at [phone]. Thank you for allowing us to assist in the care of your patient. Therapist Signature:  Luis Chaparro, PT Date: 2021     Time: 1:13 PM

## 2021-12-29 ENCOUNTER — APPOINTMENT (OUTPATIENT)
Dept: PHYSICAL THERAPY | Age: 51
End: 2021-12-29
Payer: COMMERCIAL